# Patient Record
Sex: MALE | Race: WHITE | Employment: FULL TIME | URBAN - NONMETROPOLITAN AREA
[De-identification: names, ages, dates, MRNs, and addresses within clinical notes are randomized per-mention and may not be internally consistent; named-entity substitution may affect disease eponyms.]

---

## 2017-08-03 ENCOUNTER — HOSPITAL ENCOUNTER (OUTPATIENT)
Age: 32
Discharge: HOME | End: 2017-08-03
Payer: COMMERCIAL

## 2017-08-03 LAB
ALBUMIN SERPL-MCNC: 4.4 G/DL (ref 3.5–5.1)
ALP BLD-CCNC: 40 U/L (ref 38–126)
ALT SERPL-CCNC: 109 U/L (ref 11–66)
ANION GAP SERPL CALCULATED.3IONS-SCNC: 12 MEQ/L (ref 8–16)
AST SERPL-CCNC: 50 U/L (ref 5–40)
BASOPHILS # BLD: 0.6 %
BASOPHILS ABSOLUTE: 0 THOU/MM3 (ref 0–0.1)
BILIRUB SERPL-MCNC: 0.4 MG/DL (ref 0.3–1.2)
BILIRUBIN DIRECT: < 0.2 MG/DL (ref 0–0.3)
BUN BLDV-MCNC: 11 MG/DL (ref 7–22)
CALCIUM SERPL-MCNC: 9.8 MG/DL (ref 8.5–10.5)
CHLORIDE BLD-SCNC: 101 MEQ/L (ref 98–111)
CHOLESTEROL, TOTAL: 160 MG/DL (ref 100–199)
CO2: 27 MEQ/L (ref 23–33)
CREAT SERPL-MCNC: 0.8 MG/DL (ref 0.4–1.2)
EOSINOPHIL # BLD: 1.2 %
EOSINOPHILS ABSOLUTE: 0.1 THOU/MM3 (ref 0–0.4)
GFR SERPL CREATININE-BSD FRML MDRD: > 90 ML/MIN/1.73M2
GLUCOSE BLD-MCNC: 75 MG/DL (ref 70–108)
HCT VFR BLD CALC: 46.7 % (ref 42–52)
HDLC SERPL-MCNC: 29 MG/DL
HEMOGLOBIN: 15.7 GM/DL (ref 14–18)
LDL CHOLESTEROL CALCULATED: 116 MG/DL
LYMPHOCYTES # BLD: 28.1 %
LYMPHOCYTES ABSOLUTE: 1.5 THOU/MM3 (ref 1–4.8)
MCH RBC QN AUTO: 30.9 PG (ref 27–31)
MCHC RBC AUTO-ENTMCNC: 33.6 GM/DL (ref 33–37)
MCV RBC AUTO: 91.9 FL (ref 80–94)
MONOCYTES # BLD: 9.5 %
MONOCYTES ABSOLUTE: 0.5 THOU/MM3 (ref 0.4–1.3)
NUCLEATED RED BLOOD CELLS: 0 /100 WBC
PDW BLD-RTO: 13.5 % (ref 11.5–14.5)
PLATELET # BLD: 264 THOU/MM3 (ref 130–400)
PMV BLD AUTO: 6.3 MCM (ref 7.4–10.4)
POTASSIUM SERPL-SCNC: 5 MEQ/L (ref 3.5–5.2)
RBC # BLD: 5.08 MILL/MM3 (ref 4.7–6.1)
RBC # BLD: NORMAL 10*6/UL
SEG NEUTROPHILS: 60.6 %
SEGMENTED NEUTROPHILS ABSOLUTE COUNT: 3.2 THOU/MM3 (ref 1.8–7.7)
SODIUM BLD-SCNC: 140 MEQ/L (ref 135–145)
TOTAL PROTEIN: 7.2 G/DL (ref 6.1–8)
TRIGL SERPL-MCNC: 77 MG/DL (ref 0–199)
WBC # BLD: 5.3 THOU/MM3 (ref 4.8–10.8)

## 2017-08-03 PROCEDURE — 80053 COMPREHEN METABOLIC PANEL: CPT

## 2017-08-03 PROCEDURE — 36415 COLL VENOUS BLD VENIPUNCTURE: CPT

## 2017-08-03 PROCEDURE — 85025 COMPLETE CBC W/AUTO DIFF WBC: CPT

## 2017-08-03 PROCEDURE — 82248 BILIRUBIN DIRECT: CPT

## 2017-08-03 PROCEDURE — 80061 LIPID PANEL: CPT

## 2018-03-19 RX ORDER — SULFAMETHOXAZOLE AND TRIMETHOPRIM 800; 160 MG/1; MG/1
1 TABLET ORAL 2 TIMES DAILY
Qty: 14 TABLET | Refills: 0 | Status: SHIPPED | OUTPATIENT
Start: 2018-03-19 | End: 2018-03-26

## 2020-12-22 ENCOUNTER — HOSPITAL ENCOUNTER (EMERGENCY)
Age: 35
Discharge: HOME | End: 2020-12-22
Payer: COMMERCIAL

## 2020-12-22 VITALS
RESPIRATION RATE: 16 BRPM | HEART RATE: 65 BPM | SYSTOLIC BLOOD PRESSURE: 130 MMHG | TEMPERATURE: 96.8 F | BODY MASS INDEX: 40.32 KG/M2 | WEIGHT: 288 LBS | DIASTOLIC BLOOD PRESSURE: 82 MMHG | HEIGHT: 71 IN | OXYGEN SATURATION: 96 %

## 2020-12-22 PROCEDURE — 99202 OFFICE O/P NEW SF 15 MIN: CPT

## 2020-12-22 PROCEDURE — 99202 OFFICE O/P NEW SF 15 MIN: CPT | Performed by: NURSE PRACTITIONER

## 2020-12-22 RX ORDER — ERYTHROMYCIN 5 MG/G
OINTMENT OPHTHALMIC 2 TIMES DAILY
Qty: 1 TUBE | Refills: 0 | Status: SHIPPED | OUTPATIENT
Start: 2020-12-22 | End: 2020-12-29

## 2020-12-22 ASSESSMENT — ENCOUNTER SYMPTOMS
PERI-ORBITAL EDEMA: 0
EYE DISCHARGE: 0
EYE WATERING: 0
STRIDOR: 0
BLIND SPOTS: 0
DOUBLE VISION: 0
CRUSTING: 0
WHEEZING: 0
CHEST TIGHTNESS: 0
BLURRED VISION: 0
EYE REDNESS: 0
PHOTOPHOBIA: 0
NAUSEA: 0
CHOKING: 0
EYE ITCHING: 0
SHORTNESS OF BREATH: 0
APNEA: 0
EYE INFLAMMATION: 0
EYE PAIN: 0
VOMITING: 0
COUGH: 0

## 2020-12-22 ASSESSMENT — VISUAL ACUITY
OD: 20/15
OS: 20/25

## 2020-12-23 NOTE — ED PROVIDER NOTES
[facility] 35  Urgent Care Encounter      CHIEF COMPLAINT       Chief Complaint   Patient presents with    Foreign Body in Eye       Nurses Notes reviewed and I agree except as noted in the HPI. HISTORY OFPRESENT ILLNESS   Georgette Ray is a 38 y.o. Eye Problem  Location:  Left eye  Quality:  Foreign body sensation  Severity:  Mild  Onset quality:  Sudden  Duration:  2 hours  Timing:  Constant  Progression:  Unchanged  Chronicity:  New  Context: foreign body    Context: not burn, not chemical exposure, not contact lens problem, not direct trauma, not using machinery, not scratch, not smoke exposure and not UV exposure    Context comment:  Working on car at home today and noticed it when came in. Foreign body:  Metal  Relieved by:  Nothing  Worsened by:  Nothing  Ineffective treatments:  Flushing  Associated symptoms: no blurred vision, no crusting, no decreased vision, no discharge, no double vision, no facial rash, no headaches, no inflammation, no itching, no nausea, no numbness, no photophobia, no redness, no scotomas, no swelling, no tearing, no tingling, no vomiting and no weakness    Risk factors: no conjunctival hemorrhage, no exposure to pinkeye, no previous injury to eye, no recent herpes zoster and no recent URI    Risk factors comment:  Lasix surgery in August 2020      REVIEW OF SYSTEMS     Review of Systems   Eyes: Negative for blurred vision, double vision, photophobia, pain, discharge, redness, itching and visual disturbance. Respiratory: Negative for apnea, cough, choking, chest tightness, shortness of breath, wheezing and stridor. Cardiovascular: Negative for chest pain, palpitations and leg swelling. Gastrointestinal: Negative for nausea and vomiting. Neurological: Negative for tingling, weakness, numbness and headaches. PAST MEDICAL HISTORY   History reviewed. No pertinent past medical history.     SURGICAL HISTORY     Patient  has a past surgical history that includes LASIK. CURRENT MEDICATIONS       Discharge Medication List as of 12/22/2020  7:37 PM          ALLERGIES     Patient is has No Known Allergies. FAMILY HISTORY     Patient's family history is not on file. SOCIAL HISTORY     Patient  reports that he has quit smoking. His smoking use included cigarettes. He has a 2.50 pack-year smoking history. He has never used smokeless tobacco. He reports that he does not drink alcohol or use drugs. PHYSICAL EXAM     ED TRIAGE VITALS  BP: 130/82, Temp: 96.8 °F (36 °C), Pulse: 65, Resp: 16, SpO2: 96 %  Physical Exam  Vitals signs and nursing note reviewed. Constitutional:       Appearance: Normal appearance. He is obese. HENT:      Right Ear: External ear normal.      Left Ear: External ear normal.   Eyes:      General: Lids are normal.         Left eye: Foreign body present. Comments: Removed with swab. Dillan tolerated well. Denied concerns or complaints. Neck:      Musculoskeletal: Normal range of motion. Pulmonary:      Effort: Pulmonary effort is normal.   Musculoskeletal: Normal range of motion. Skin:     General: Skin is warm. Neurological:      General: No focal deficit present. Mental Status: He is alert and oriented to person, place, and time. Psychiatric:         Mood and Affect: Mood normal.         Behavior: Behavior normal.         Thought Content: Thought content normal.         Judgment: Judgment normal.         DIAGNOSTIC RESULTS   Labs:No results found for this visit on 12/22/20. IMAGING:  No orders to display     URGENT CARE COURSE:     Vitals:    12/22/20 1927   BP: 130/82   Pulse: 65   Resp: 16   Temp: 96.8 °F (36 °C)   TempSrc: Temporal   SpO2: 96%   Weight: 288 lb (130.6 kg)   Height: 5' 11\" (1.803 m)       Medications - No data to display  PROCEDURES:  None  FINAL IMPRESSION      1.  Foreign body in anterior segment of left eyeball        DISPOSITION/PLAN   Decision To Discharge    Use medication as directed  Don't rub the eye  Do not wear contact lens ×1 week  Monitor for any changes such as drainage, pain, diplopia, photophobia or any other visual changes. Patient should be reevaluated in 48 hours if no better by ophthalmology or there regular eye doctor. If the patient has any changes there to go to the emergency department for reevaluation and further management of care regarding eye pain or visual changes. The patient is agreeable to the treatment plan and left ambulatory without any problems.     PATIENT REFERRED TO:  Corrine Elizondo MD  [address] [place] [address]  [phone]    Call   As needed    Maddie Chairez MD  [facility] [address]  [phone]    Schedule an appointment as soon as possible for a visit in 1 week  For wound re-check    DISCHARGE MEDICATIONS:  Discharge Medication List as of 12/22/2020  7:37 PM      START taking these medications    Details   erythromycin (ROMYCIN) 5 MG/GM ophthalmic ointment Place into the left eye 2 times daily for 7 days, Left Eye, 2 TIMES DAILY Starting Tue 12/22/2020, Until Tue 12/29/2020, For 7 days, Disp-1 Tube, R-0, Normal           Discharge Medication List as of 12/22/2020  7:37 PM          FRANK Duran CNP, APRN - CNP  12/22/20 1942

## 2020-12-23 NOTE — ED NOTES
Pt. Released in stable condition, ambulated per self to private car. Instructed pt to follow-up with family doctor as needed for recheck or go directly to the emergency department for any concerns/worsening conditions. Pt. Verbalized understanding of instructions. No questions at this time. RX in hand.       Harrison Azul RN  12/22/20 [id number]

## 2023-08-03 ENCOUNTER — HOSPITAL ENCOUNTER (OUTPATIENT)
Dept: CT IMAGING | Age: 38
Discharge: HOME | End: 2023-08-03
Payer: COMMERCIAL

## 2023-08-03 ENCOUNTER — HOSPITAL ENCOUNTER (OUTPATIENT)
Dept: ULTRASOUND IMAGING | Age: 38
Discharge: HOME | End: 2023-08-03
Payer: COMMERCIAL

## 2023-08-03 DIAGNOSIS — R10.11 ABDOMINAL PAIN, RIGHT UPPER QUADRANT: ICD-10-CM

## 2023-08-03 DIAGNOSIS — R10.84 ABDOMINAL PAIN, GENERALIZED: ICD-10-CM

## 2023-08-03 DIAGNOSIS — R11.0 NAUSEA: ICD-10-CM

## 2023-08-03 DIAGNOSIS — R14.0 GASTRIC TYMPANY: ICD-10-CM

## 2023-08-03 PROCEDURE — 76705 ECHO EXAM OF ABDOMEN: CPT

## 2023-08-03 PROCEDURE — 6360000004 HC RX CONTRAST MEDICATION

## 2023-08-03 PROCEDURE — 74177 CT ABD & PELVIS W/CONTRAST: CPT

## 2023-08-03 RX ADMIN — IOPAMIDOL 100 ML: 755 INJECTION, SOLUTION INTRAVENOUS at 07:51

## 2024-03-21 ENCOUNTER — HOSPITAL ENCOUNTER (OUTPATIENT)
Age: 39
Discharge: HOME | End: 2024-03-21
Payer: COMMERCIAL

## 2024-03-21 LAB
ALBUMIN SERPL BCG-MCNC: 4.7 G/DL (ref 3.5–5.1)
ALP SERPL-CCNC: 60 U/L (ref 38–126)
ALT SERPL W/O P-5'-P-CCNC: 34 U/L (ref 11–66)
ANION GAP SERPL CALC-SCNC: 16 MEQ/L (ref 8–16)
AST SERPL-CCNC: 25 U/L (ref 5–40)
BILIRUB SERPL-MCNC: 0.3 MG/DL (ref 0.3–1.2)
BUN SERPL-MCNC: 15 MG/DL (ref 7–22)
CALCIUM SERPL-MCNC: 9.5 MG/DL (ref 8.5–10.5)
CHLORIDE SERPL-SCNC: 102 MEQ/L (ref 98–111)
CHOLEST SERPL-MCNC: 187 MG/DL (ref 100–199)
CO2 SERPL-SCNC: 22 MEQ/L (ref 23–33)
CREAT SERPL-MCNC: 0.8 MG/DL (ref 0.4–1.2)
GFR SERPL CREATININE-BSD FRML MDRD: > 60 ML/MIN/1.73M2
GLUCOSE SERPL-MCNC: 85 MG/DL (ref 70–108)
HDLC SERPL-MCNC: 53 MG/DL
LDLC SERPL CALC-MCNC: 97 MG/DL
POTASSIUM SERPL-SCNC: 4.3 MEQ/L (ref 3.5–5.2)
PROT SERPL-MCNC: 7.2 G/DL (ref 6.1–8)
SODIUM SERPL-SCNC: 140 MEQ/L (ref 135–145)
TRIGL SERPL-MCNC: 186 MG/DL (ref 0–199)

## 2024-03-21 PROCEDURE — 82785 ASSAY OF IGE: CPT

## 2024-03-21 PROCEDURE — 80061 LIPID PANEL: CPT

## 2024-03-21 PROCEDURE — 80053 COMPREHEN METABOLIC PANEL: CPT

## 2024-03-21 PROCEDURE — 86003 ALLG SPEC IGE CRUDE XTRC EA: CPT

## 2024-03-21 PROCEDURE — 36415 COLL VENOUS BLD VENIPUNCTURE: CPT

## 2024-03-23 LAB — IGE SERPL-ACNC: 26 IU/ML (ref 0–100)

## 2024-03-26 LAB
ALLERGEN BARLEY IGE: 1.9 KU/L (ref 0–0.34)
ALLERGEN BEEF: < 0.1 KU/L (ref 0–0.34)
ALLERGEN CABBAGE IGE: 2.03 KU/L (ref 0–0.34)
ALLERGEN CARROT IGE: 1.72 KU/L (ref 0–0.34)
ALLERGEN CHICKEN IGE: < 0.1 KU/L (ref 0–0.34)
ALLERGEN CODFISH IGE: < 0.1 KU/L (ref 0–0.34)
ALLERGEN CORN IGE: 1.57 KU/L (ref 0–0.34)
ALLERGEN COW MILK IGE: < 0.1 KU/L (ref 0–0.34)
ALLERGEN CRAB IGE: 0.21 KU/L (ref 0–0.34)
ALLERGEN EGG WHITE IGE: < 0.1 KU/L (ref 0–0.34)
ALLERGEN GRAPE IGE: 0.62 KU/L (ref 0–0.34)
ALLERGEN LETTUCE IGE: 1.59 KU/L (ref 0–0.34)
ALLERGEN NAVY BEAN: 2.02 KU/L (ref 0–0.34)
ALLERGEN OAT: 1.73 KU/L (ref 0–0.34)
ALLERGEN ORANGE IGE: 1.27 KU/L (ref 0–0.34)
ALLERGEN PEANUT (F13) IGE: 2.31 KU/L (ref 0–0.34)
ALLERGEN PEPPER C. ANNUUM IGE: 1.27 KU/L (ref 0–0.34)
ALLERGEN PORK: < 0.1 KU/L (ref 0–0.34)
ALLERGEN RICE IGE: 2.53 KU/L (ref 0–0.34)
ALLERGEN RYE IGE: 1.84 KU/L (ref 0–0.34)
ALLERGEN SOYBEAN IGE: 1.7 KU/L (ref 0–0.34)
ALLERGEN TOMATO IGE: 2.1 KU/L (ref 0–0.34)
ALLERGEN TUNA IGE: < 0.1 KU/L (ref 0–0.34)
ALLERGEN WHEAT IGE: 1.99 KU/L (ref 0–0.34)
IGE SERPL-ACNC: 26 IU/ML (ref 0–100)
POTATO, IGE: 2.04 KU/L (ref 0–0.34)
SHRIMP: < 0.1 KU/L (ref 0–0.34)